# Patient Record
(demographics unavailable — no encounter records)

---

## 2025-04-29 NOTE — DISCUSSION/SUMMARY
[de-identified] : Despite some moderate findings on both the radiograph as well as the MRI patient is essentially pain-free in the right hip.  He is free to resume full activities and full work with no restrictions.  Patient understands that he can have overuse related symptoms and if symptoms return to the hip he shall return to use of the Celebrex and follow-up for repeat consultation at that point.  This consultation lasted 30 minutes exclusive teaching time and any separately billed procedures.

## 2025-04-29 NOTE — REASON FOR VISIT
[Workers' Comp: Date of Injury: _____] : This visit is related to worker's compensation. Date of Injury: [unfilled] [Hip Pain] : hip pain [FreeTextEntry2] : review mri results of right hip

## 2025-04-29 NOTE — HISTORY OF PRESENT ILLNESS
[de-identified] : Patient returns today he feels significant improvement in terms of his right hip.  Patient had a recent radiograph and MRI both indicating moderate cartilage loss.  Early secondary findings of arthritis such as osteophyte formation also noted.  Surprisingly patient despite his findings on radiographs has minimal pain he is anxious to return to full work with no restrictions.Patient was previously prescribed Celebrex which she took with significant improvement.

## 2025-04-29 NOTE — HISTORY OF PRESENT ILLNESS
[de-identified] : Patient returns today he feels significant improvement in terms of his right hip.  Patient had a recent radiograph and MRI both indicating moderate cartilage loss.  Early secondary findings of arthritis such as osteophyte formation also noted.  Surprisingly patient despite his findings on radiographs has minimal pain he is anxious to return to full work with no restrictions.Patient was previously prescribed Celebrex which she took with significant improvement.

## 2025-04-29 NOTE — PHYSICAL EXAM
[de-identified] : Patient is full passive internal/external rotation with minimal restriction pain noted.  Neurovasc intact distally.

## 2025-04-29 NOTE — DISCUSSION/SUMMARY
[de-identified] : Despite some moderate findings on both the radiograph as well as the MRI patient is essentially pain-free in the right hip.  He is free to resume full activities and full work with no restrictions.  Patient understands that he can have overuse related symptoms and if symptoms return to the hip he shall return to use of the Celebrex and follow-up for repeat consultation at that point.  This consultation lasted 30 minutes exclusive teaching time and any separately billed procedures.

## 2025-04-29 NOTE — PHYSICAL EXAM
[de-identified] : Patient is full passive internal/external rotation with minimal restriction pain noted.  Neurovasc intact distally.